# Patient Record
Sex: MALE | Race: WHITE | Employment: OTHER | URBAN - METROPOLITAN AREA
[De-identification: names, ages, dates, MRNs, and addresses within clinical notes are randomized per-mention and may not be internally consistent; named-entity substitution may affect disease eponyms.]

---

## 2021-07-27 ENCOUNTER — TRANSFERRED RECORDS (OUTPATIENT)
Dept: HEALTH INFORMATION MANAGEMENT | Facility: CLINIC | Age: 76
End: 2021-07-27

## 2021-09-20 ENCOUNTER — TRANSFERRED RECORDS (OUTPATIENT)
Dept: HEALTH INFORMATION MANAGEMENT | Facility: CLINIC | Age: 76
End: 2021-09-20

## 2021-09-22 ENCOUNTER — TRANSFERRED RECORDS (OUTPATIENT)
Dept: HEALTH INFORMATION MANAGEMENT | Facility: CLINIC | Age: 76
End: 2021-09-22

## 2021-09-27 ENCOUNTER — TRANSFERRED RECORDS (OUTPATIENT)
Dept: HEALTH INFORMATION MANAGEMENT | Facility: CLINIC | Age: 76
End: 2021-09-27

## 2021-09-28 ENCOUNTER — TRANSCRIBE ORDERS (OUTPATIENT)
Dept: OTHER | Age: 76
End: 2021-09-28

## 2021-09-28 DIAGNOSIS — C7A.8 OTHER MALIGNANT NEUROENDOCRINE TUMORS (H): Primary | ICD-10-CM

## 2021-09-29 ENCOUNTER — PATIENT OUTREACH (OUTPATIENT)
Dept: SURGERY | Facility: CLINIC | Age: 76
End: 2021-09-29

## 2021-09-29 ENCOUNTER — TELEPHONE (OUTPATIENT)
Dept: ONCOLOGY | Facility: CLINIC | Age: 76
End: 2021-09-29

## 2021-09-29 NOTE — TELEPHONE ENCOUNTER
[facility]Imaging pertaining to Malignant Neuroendocrine Tumors per in basket from Fern Dunn RN.

## 2021-09-29 NOTE — PROGRESS NOTES
New Patient Oncology Nurse Navigator Note     Referring provider: Dr. Alejandre     Referring Clinic/Organization: [facility]      Referred to: Surgical Oncology      Requested provider (if applicable): Dr. 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    Past Surgical History:   Procedure Laterality Date     BYPASS GRAFT ARTERY CORONARY  10/6/2015          CERVICAL FUSION  1998,2007     TOTAL KNEE ARTHROPLASTY  2007       No current outpatient medications on file.           Allergies   Allergen Reactions     Ticlid [Ticlopidine] Unknown          Clinical Assessment / Barriers to Care (Per Nurse):    None at this time.     Records Location:     [facility]   Faxed - Media tab/Scanned     Records Needed:     ABDOMINAL IMAGING (CT SCANS, MRI, US)  DATING BACK TO 2020      Additional testing needed prior to consult:     NONE AT THIS TIME    Referral updates and Plan:       Consult with Surgical Oncology     09/29/2021 1:20 PM - Records needed, message sent to records team to obtain records and imaging ASAP.     10/01/2021 11:27 AM - records team updated that imaging needs to be loaded by 10/6, scheduling updated with scheduling instructions to contact patient.       Fern Dunn, RN, BSN   Surgical Oncology New Patient Nurse Navigator  [facility]  [phone]

## 2021-10-02 ENCOUNTER — TRANSFERRED RECORDS (OUTPATIENT)
Dept: HEALTH INFORMATION MANAGEMENT | Facility: CLINIC | Age: 76
End: 2021-10-02

## 2021-10-04 ENCOUNTER — PRE VISIT (OUTPATIENT)
Dept: OTHER | Age: 76
End: 2021-10-04

## 2021-10-04 NOTE — TELEPHONE ENCOUNTER
Action 10.4.21 9:32 AM KHUSHBU   Action Taken Requested all records including all CT scan and PET scan images from [facility] [phone]. I requested records STAT but the [facility] mails images and they work on a first in, first out records release so I do not know when we will receive records.

## 2021-10-05 ENCOUNTER — LAB (OUTPATIENT)
Dept: LAB | Facility: CLINIC | Age: 76
End: 2021-10-05
Payer: COMMERCIAL

## 2021-10-05 DIAGNOSIS — Z71.9 ENCOUNTER FOR CONSULTATION: Primary | ICD-10-CM

## 2021-10-05 PROCEDURE — 88321 CONSLTJ&REPRT SLD PREP ELSWR: CPT | Performed by: PATHOLOGY

## 2021-10-06 LAB
PATH REPORT.COMMENTS IMP SPEC: NORMAL
PATH REPORT.FINAL DX SPEC: NORMAL
PATH REPORT.GROSS SPEC: NORMAL
PATH REPORT.MICROSCOPIC SPEC OTHER STN: NORMAL
PATH REPORT.RELEVANT HX SPEC: NORMAL
PATH REPORT.RELEVANT HX SPEC: NORMAL
PATH REPORT.SITE OF ORIGIN SPEC: NORMAL

## 2021-10-13 ENCOUNTER — PATIENT OUTREACH (OUTPATIENT)
Dept: ONCOLOGY | Facility: CLINIC | Age: 76
End: 2021-10-13

## 2021-10-13 NOTE — PROGRESS NOTES
Spoke to Dr [facility] as [facility] was [facility]admitted. The patient's daughter Haily arranged a video visit with Dr Amador that will be cancelled on 10/14/2021 at 08:00. Message sent to new patient scheduling to cancel the appointment with Dr Amador.  Answered all patient daughter's questions and verbalized understanding. Michelle Serna RN, BSN.

## 2021-10-19 ENCOUNTER — PATIENT OUTREACH (OUTPATIENT)
Dept: SURGERY | Facility: CLINIC | Age: 76
End: 2021-10-19

## 2021-10-19 NOTE — PROGRESS NOTES
Surgical Oncology RN Care Coordination Note:     Called and spoke with patients daughter to get update on status of patient and his admission at outside hospital. Per daughter he is still admitted and currently no plan for discharge. They have attempted to transfer him to the  but currently no beds available for transfer. Informed her that I unfortunately don't have any ability to change this but I can see that its been initiated. Informed her this is coordinated by referring hospital and accepting team, not the outpatient team. She verbalized understanding and has our direct number for future updates. Will defer referral for now and patients daughter will contact us when patient is discharged.     Fern Dunn, RN, BSN  Care Coordinator